# Patient Record
Sex: FEMALE
[De-identification: names, ages, dates, MRNs, and addresses within clinical notes are randomized per-mention and may not be internally consistent; named-entity substitution may affect disease eponyms.]

---

## 2018-03-14 ENCOUNTER — HOSPITAL ENCOUNTER (EMERGENCY)
Dept: HOSPITAL 14 - H.ER | Age: 53
Discharge: HOME | End: 2018-03-14
Payer: COMMERCIAL

## 2018-03-14 VITALS — SYSTOLIC BLOOD PRESSURE: 120 MMHG | HEART RATE: 65 BPM | RESPIRATION RATE: 20 BRPM | DIASTOLIC BLOOD PRESSURE: 70 MMHG

## 2018-03-14 VITALS — TEMPERATURE: 97.8 F

## 2018-03-14 VITALS — OXYGEN SATURATION: 100 %

## 2018-03-14 DIAGNOSIS — M72.2: Primary | ICD-10-CM

## 2018-03-14 DIAGNOSIS — M20.12: ICD-10-CM

## 2018-03-14 PROCEDURE — 96372 THER/PROPH/DIAG INJ SC/IM: CPT

## 2018-03-14 PROCEDURE — 73630 X-RAY EXAM OF FOOT: CPT

## 2018-03-14 PROCEDURE — 99283 EMERGENCY DEPT VISIT LOW MDM: CPT

## 2018-03-14 NOTE — RAD
PROCEDURE:  Right Foot Radiographs.



HISTORY:

foot pain  



COMPARISON:

None.



FINDINGS:



BONES:

No evidence of acute displaced fracture nor dislocation.  The osseous 

structures appear intact with no cortical destructive changes.  Small 

posterior and very tiny plantar calcaneal enthesophytes are present



JOINTS:

Minor hallux valgus deformity with on minimal DJD 1st MTP joint. 



SOFT TISSUES:

Normal. 



OTHER FINDINGS:

None.



IMPRESSION:

No evidence of acute displaced fracture nor dislocation.  Small 

posterior and very tiny plantar calcaneal enthesophyte.

## 2018-03-14 NOTE — ED PDOC
Lower Extremity Pain/Injury


Time Seen by Provider: 03/14/18 13:27


Chief Complaint (Nursing): Lower Extremity Problem/Injury


Chief Complaint (Provider): Bilateral foot pain


History Per: Patient, 


History/Exam Limitations: no limitations


Onset/Duration Of Symptoms: Days (x 5 months)


Current Symptoms Are (Timing): Still Present


Additional Complaint(s): 


Patient is a 53 y/o female who presents complaining of pain and swelling to the 

bilateral feet, onset 5 months ago. No fall or injury reported. Pain is 

localized to the heel/plantar aspect of foot and worse in the morning, with her 

first few steps. States she initially went to see a doctor at another hospital 

but never had x-rays due to lack of insurance. Was taking Ibuprofen sent to her 

from [facility] with minimal improvement; Last dose was 2 months ago.  Otherwise:  

(-) knee pain, (-) numbness, (-) weakness (-) loss of sensation (-) rash.  





PMD: None 





Past Medical History


Reviewed: Historical Data, Nursing Documentation, Vital Signs


Vital Signs: 





 Last Vital Signs











Temp  97.8 F   03/14/18 13:21


 


Pulse  74   03/14/18 13:21


 


Resp  16   03/14/18 13:21


 


BP  164/74 H  03/14/18 13:21


 


Pulse Ox  100   03/14/18 13:21














- Medical History


PMH: 


   Denies: Diabetes, HTN


Other PMH: Varicose veins





- Surgical History


Other surgeries: Right eye surgery for obstruction of lacrimal duct





- Family History


Family History: States: Unknown Family Hx





- Home Medications


Home Medications: 


 Ambulatory Orders











 Medication  Instructions  Recorded


 


Meloxicam [Mobic] 15 mg PO DAILY #14 tab 03/14/18














- Allergies


Allergies/Adverse Reactions: 


 Allergies











Allergy/AdvReac Type Severity Reaction Status Date / Time


 


No Known Allergies Allergy   Verified 03/14/18 13:21














Review of Systems


ROS Statement: Except As Marked, All Systems Reviewed And Found Negative


Musculoskeletal: Positive for: Foot Pain (bilateral pain at heels)


Neurological: Negative for: Weakness, Numbness





Physical Exam





- Reviewed


Nursing Documentation Reviewed: Yes


Vital Signs Reviewed: Yes





- Physical Exam


Comments: 


GENERAL APPEARANCE:  Patient is awake, alert, oriented x 3, in no acute 

distress. Ambulatory in ED with steady gait. 


SKIN:  Warm, dry; (-) cyanosis.


RESPIRATORY: lungs clear to auscultation bilaterally (-) rales (-) rhonchi (-) 

wheezing


CARDIAC: (-) murmur


LOWER EXTREMITY:  (-) swelling, (-) ecchymosis. (+) tenderness to plantar 

aspect of bilateral hindfoot (-) pedal edema (-) overlying skin changes (-) 

evidence of infection; (-) calf tenderness; Full ROM of the ankles. Achilles 

tendon intact and nontender. Knees: (-) injury.


CARDIOVASCULAR:  (+) DP pulses 2+.


NEUROLOGIC:  (+) distal sensation. 





- ECG


O2 Sat by Pulse Oximetry: 100 (RA)


Pulse Ox Interpretation: Normal





Medical Decision Making


Medical Decision Making: 





Initial Impression: Plantar fasciitis 





Time: 13:49


Initial Plan:


* X-Ray left foot


* X-Ray right foot


* Toradol 30 mg IM





Discussed with patient limitations of x-ray imaging, however patient requests 

to have x-rays done. 





1435


XRs reviewed and resulted as below.


PROCEDURE:  Right Foot Radiographs.


HISTORY:


foot pain  


COMPARISON:


None.


FINDINGS:


BONES:


No evidence of acute displaced fracture nor dislocation.  The osseous 

structures appear intact with no cortical destructive changes.  Small posterior 

and very tiny plantar calcaneal enthesophytes are present


JOINTS:


Minor hallux valgus deformity with on minimal DJD 1st MTP joint. 


SOFT TISSUES:


Normal. 


OTHER FINDINGS:


None.


IMPRESSION:


No evidence of acute displaced fracture nor dislocation.  Small posterior and 

very tiny plantar calcaneal enthesophyte.





PROCEDURE:  Left Foot Radiographs.


HISTORY:


foot pain  


COMPARISON:


Correlation made with concurrent radiographs of the right foot.


FINDINGS:


BONES: No evidence of acute displaced fracture nor dislocation.  The osseous 

structures appear intact with no cortical destructive changes.  Small plantar 

and posterior calcaneal enthesophytes are present. 


JOINTS: Minor hallux valgus deformity with minimal DJD 1st MTP joint. 


SOFT TISSUES: Normal. 


OTHER FINDINGS: None.


IMPRESSION:


No evidence of acute displaced fracture nor dislocation.  Minimal hallux valgus 

deformity with DJD 1st MTP joint. Calcaneal enthesophytes as above. Plantar 

fasciitis x-ray





1440


On re-evaluation, patient reports improvement of symptoms. On exam, patient 

remains AAOx3, in no acute distress. On exam, neck is supple, lungs CTA, 

cardiac RRR, abdomen is soft and non-tender, neuro exam shows no focal 

findings. Ambulatory in ED with steady gait. Repeat BP: 120/70. VSS.


Diagnostic results d/w the patient in great detail. Dx of plantar fasciitis, 

acute foot pain d/w the patient. 


Based on history, exam and diagnostic results plan will be for discharge and 

outpatient follow up.





Patient counseled regarding diagnosis and advised to treat with NSAIDS at home. 

Advised to follow up with the clinic/podiatry for further evaluation. Return to 

the emergency room at any time for any new or worsening symptoms.





Patient states she fully agrees with and understands discharge instructions. 

States that she agrees with the plan and disposition. Verbalized and repeated 

discharge instructions and plan. I have given the patient opportunity to ask 

any additional questions.


--------------------------------------------------------------------------------

-----------------


Scribe Attestation:   


Documented by Lacy John, acting as a scribe for Lauryn Rosenbaum PA-C





Provider Scribe Attestation:


All medical record entries made by the Scribe were at my direction and 

personally dictated by me. I have reviewed the chart and agree that the record 

accurately reflects my personal performance of the history, physical exam, 

medical decision making, and the department course for this patient. I have 

also personally directed, reviewed, and agree with the discharge instructions 

and disposition.





Disposition





- Clinical Impression


Clinical Impression: 


 Plantar fasciitis, Foot pain, bilateral








- Patient ED Disposition


Is Patient to be Admitted: No


Counseled Patient/Family Regarding: Studies Performed, Diagnosis, Need For 

Followup, Rx Given





- Disposition


Referrals: 


Heath Reddy MD [Staff Provider] - 


Disposition: Routine/Home


Disposition Time: 14:45


Condition: STABLE


Prescriptions: 


Meloxicam [Mobic] 15 mg PO DAILY #14 tab


Instructions:  Heel Pain (Caused by Plantar Fasciitis), Plantar Fasciitis 

Exercises


Forms:  [employer] (Guyanese)


Print Language: Mongolian





- POA


Present On Arrival: None

## 2018-03-14 NOTE — RAD
PROCEDURE:  Left Foot Radiographs.



HISTORY:

foot pain  



COMPARISON:

Correlation made with concurrent radiographs of the right foot.



FINDINGS:



BONES:

No evidence of acute displaced fracture nor dislocation.  The osseous 

structures appear intact with no cortical destructive changes.  Small 

plantar and posterior calcaneal enthesophytes are present. 



JOINTS:

Minor hallux valgus deformity with minimal DJD 1st MTP joint. 



SOFT TISSUES:

Normal. 



OTHER FINDINGS:

None.



IMPRESSION:

No evidence of acute displaced fracture nor dislocation.  Minimal 

hallux valgus deformity with DJD 1st MTP joint. Calcaneal 

enthesophytes as above. 



Plantar fasciitis x-ray

## 2018-04-30 ENCOUNTER — HOSPITAL ENCOUNTER (EMERGENCY)
Dept: HOSPITAL 14 - H.ER | Age: 53
Discharge: HOME | End: 2018-04-30
Payer: COMMERCIAL

## 2018-04-30 VITALS
OXYGEN SATURATION: 100 % | RESPIRATION RATE: 20 BRPM | TEMPERATURE: 98.7 F | SYSTOLIC BLOOD PRESSURE: 121 MMHG | DIASTOLIC BLOOD PRESSURE: 71 MMHG | HEART RATE: 60 BPM

## 2018-04-30 DIAGNOSIS — M72.2: Primary | ICD-10-CM

## 2018-04-30 DIAGNOSIS — I83.93: ICD-10-CM
